# Patient Record
Sex: MALE | Race: WHITE | Employment: FULL TIME | URBAN - METROPOLITAN AREA
[De-identification: names, ages, dates, MRNs, and addresses within clinical notes are randomized per-mention and may not be internally consistent; named-entity substitution may affect disease eponyms.]

---

## 2019-02-14 ENCOUNTER — HOSPITAL ENCOUNTER (OUTPATIENT)
Dept: PHYSICAL THERAPY | Age: 54
Discharge: HOME | End: 2019-02-14
Payer: COMMERCIAL

## 2019-02-14 PROCEDURE — 97162 PT EVAL MOD COMPLEX 30 MIN: CPT | Performed by: PHYSICAL THERAPIST

## 2019-02-14 PROCEDURE — 97110 THERAPEUTIC EXERCISES: CPT | Performed by: PHYSICAL THERAPIST

## 2019-02-14 NOTE — PROGRESS NOTES
PT DAILY TREATMENT NOTE  Patient Name: Janet Valle Date:2019 : 1965 [x]  Patient  Verified Payor: Flores Garsias / Plan: [employer] / Product Type: HMO /   
In [employer]  Out time:915 Total Treatment Time (min): 41 Visit #: 1 of  Treatment Area: Low back pain [M54.5] SUBJECTIVE Pain Level (0-10 scale): 1-2/10 Any medication changes, allergies to medications, adverse drug reactions, diagnosis change, or new procedure performed?: [x] No    [] Yes (see summary sheet for update) Subjective functional status/changes:   [] No changes reported Pt is a 52year old male with subjective complaints of chronic low back pain that started 3 years ago after bending over playing with his dog. He notes that he had increased back pain without radicular symptoms and was unable to standing up. He notes that every year since he has tweaked his back however, over the last year he has tweaked it more frequently. He notes that last week he re-injured his back picking up his symptoms. He currently has increased/sharp pain in the center of his low back that he rates a 1-2/10. Functional limitations include lifting, sitting long periods of time, heavy lifting, playing golf. He takes over the counter medication for pain management. Xray showed a little arthritis. No MRI performed of lumbar spine. PMHx: skin cancer on neck, face, back. Negative for red flags. FOTO: 58/100. OBJECTIVE 16 min [x]Eval                  []Re-Eval  
 
 
25 min Therapeutic Exercise:  [x] See flow sheet : Bianka extension principle Rationale: increase ROM and increase strength to improve the patients ability to improve functional mobility With 
 [] TE 
 [] TA 
 [] neuro 
 [] other: Patient Education: [x] Review HEP [] Progressed/Changed HEP based on:  
[] positioning   [] body mechanics   [] transfers   [] heat/ice application [] Graston Education: Explained the effects and benefits of Graston Technique therapy including potential for post treatment soreness and bruising. [] Other:   
 
Other Objective/Functional Measures:  
Upon evaluation, pt ambulates with normalized gait pattern. AROM of the lumbar spine is WNLs. Repeated extension in standing increased low back pain however, in prone reduced symptoms. SI in normal alignment. Strength not assessed secondary to time constraints. Pt given REIL for HEP to perform every hour to assess effects next visit. Pain Level (0-10 scale) post treatment: 1/10 ASSESSMENT/Changes in Function:[x]  See Plan of Care 
[]  See progress note/recertification 
[]  See Discharge Summary Progress towards goals / Updated goals: PER POC PLAN 
[]  Upgrade activities as tolerated     [x]  Continue plan of care 
[]  Update interventions per flow sheet      
[]  Discharge due to:_ 
[x]  Other 2-3x/week for 4 weeks Justification for Eval Code Complexity: 
Patient History : chronicity Examination see exam  
Clinical Presentation: evovling Clinical Decision Making : FOTO : 59 /100 Capri Bolivar DPT 2/14/2019  8:37 AM 
 
No future appointments.

## 2019-02-14 NOTE — PROGRESS NOTES
[address] PH[facility]ERAPY AT [facility] [address]  Phone [phone]  Fax [phone] PLAN OF CARE / STATEMENT OF MEDICAL NECESSITY FOR PHYSICAL THERAPY SERVICES Patient Name: Yonas Deleon : 1965 Medical  
Diagnosis: Low back pain [M54.5] Treatment Diagnosis: Low back pain Onset Date: chronic Referral Source: Malcom Cowan MD Start of Care [facility]): 2019 Prior Hospitalization: See medical history Provider #: [id number] Prior Level of Function: IND Comorbidities: Skin cancer Medications: Verified on Patient Summary List  
The Plan of Care and following information is based on the information from the initial evaluation.  
================================================================================= Assessment / key information:  Pt is a 52year old male with subjective complaints of chronic low back pain that started 3 years ago after bending over playing with his dog. He notes that he had increased back pain without radicular symptoms and was unable to standing up. He notes that every year since he has tweaked his back however, over the last year he has tweaked it more frequently. He notes that last week he re-injured his back picking up his symptoms. He currently has increased/sharp pain in the center of his low back that he rates a 1-2/10. Functional limitations include lifting, sitting long periods of time, heavy lifting, playing golf. He takes over the counter medication for pain management. Xray showed a little arthritis. No MRI performed of lumbar spine. PMHx: skin cancer on neck, face, back. Negative for red flags. FOTO: 58/100. Upon evaluation, pt ambulates with normalized gait pattern. AROM of the lumbar spine is WNLs. Repeated extension in standing increased low back pain however, in prone reduced symptoms. SI in normal alignment.  Strength not assessed secondary to time constraints. Pt given REIL for HEP to perform every hour to assess effects next visit. Pt would benefit from a course of skilled PT to improve back pain to prevent future occurrence of back pain.  
================================================================================= Eval Complexity: History: MEDIUM  Complexity : 1-2 comorbidities / personal factors will impact the outcome/ POC Exam:HIGH Complexity : 4+ Standardized tests and measures addressing body structure, function, activity limitation and / or participation in recreation  Presentation: MEDIUM Complexity : Evolving with changing characteristics  Clinical Decision Making:MEDIUM Complexity : FOTO score of 26-74Overall Complexity:MEDIUM Problem List: pain affecting function, decrease ROM, decrease strength, impaired gait/ balance, decrease ADL/ functional abilitiies, decrease activity tolerance, decrease flexibility/ joint mobility and decrease transfer abilities Treatment Plan may include any combination of the following: Therapeutic exercise, Therapeutic activities, Neuromuscular re-education, Physical agent/modality, Gait/balance training, Manual therapy and Patient education Patient / Family readiness to learn indicated by: asking questions and trying to perform skills Persons(s) to be included in education: patient (P) Barriers to Learning/Limitations: None Measures taken:   
Patient Goal (s): Reduce constant pain and tightness hopefully prevent future episodes Patient self reported health status: good Rehabilitation Potential: good ? Short Term Goals: To be accomplished in  1  weeks: 1. Pt will be IND and compliant with HEP for self-management of symptoms. ? Long Term Goals: To be accomplished in  4  weeks: 1. Pt will be able to sit for 4 hours without pain to be able to perform job duties without restrictions. 2. Pt will improve core/lumbar strength to 5/5 to prevent re-occurrence of symptoms. 3. Pt will be able lift 20# from floor to counter with proper body mechanics to decrease stress on the lower spine. 4. Pt will improve FOTO score to at least 69/100 as a functional indicator of improved mobility. Frequency / Duration:   Patient to be seen  2-3  times per week for 4  weeks: 
Patient / Caregiver education and instruction: exercises G-Codes (GP): QI Therapist Signature: Ottoniel Easton DPT Date: 2/14/2019 Certification Period: NA Time: 9:28 AM  
=========================================================================================== I certify that the above Physical Therapy Services are being furnished while the patient is under my care. I agree with the treatment plan and certify that this therapy is necessary. Physician Signature:       Date:      Time:  Please sign and return to [facility] or you may fax the signed copy to [phone]. Thank you.

## 2019-02-18 ENCOUNTER — HOSPITAL ENCOUNTER (OUTPATIENT)
Dept: PHYSICAL THERAPY | Age: 54
Discharge: HOME | End: 2019-02-18
Payer: COMMERCIAL

## 2019-02-18 PROCEDURE — 97110 THERAPEUTIC EXERCISES: CPT | Performed by: PHYSICAL THERAPIST

## 2019-02-18 PROCEDURE — 97012 MECHANICAL TRACTION THERAPY: CPT | Performed by: PHYSICAL THERAPIST

## 2019-02-18 NOTE — PROGRESS NOTES
PT DAILY TREATMENT NOTE  Patient Name: Zenon Lozano Date:2019 : 1965 [x]  Patient  Verified Payor: Paulie Calles / Plan: [employer] / Product Type: HMO /   
In time:745am  Out time:845am 
Total Treatment Time (min): 60 Total Timed Codes (min): 60 
1:1 Treatment Time (min): na  
Visit #: 2 of  Treatment Area: Low back pain [M54.5] SUBJECTIVE Pain Level (0-10 scale): 1 Any medication changes, allergies to medications, adverse drug reactions, diagnosis change, or new procedure performed?: [x] No    [] Yes (see summary sheet for update) Subjective functional status/changes:   [] No changes reported Pt retold hx. States the REIL caused elbow sx. OBJECTIVE Modality rationale: decrease inflammation, decrease pain, increase tissue extensibility and increase muscle contraction/control to improve the patients ability to perform functional mobility and improve activity  endurance Min Type Additional Details  
 [] Estim: []Att   []Unatt  []TENS instruct []IFC  []Premod []NMES                       []Other:  []w/US   []w/ice   []w/heat Position: Location:  
15 [x]  Traction: [] Cervical       []Lumbar 
                     [] Prone          []Supine []Intermittent   []Continuous Lbs:85/60[] before manual 
[] after manual  
 []  Ultrasound: []Continuous   [] Pulsed []1MHz   []3MHz Location: 
W/cm2:  
 []  Iontophoresis with dexamethasone Location: [] Take home patch  
[] In clinic  
 []  Ice     []  heat 
[]  Ice massage Position: Location:  
 []  Vasopneumatic Device Pressure: [] lo [] med [] hi  
Temp: [] lo [] med [] hi  
[] Skin assessment post-treatment:  []intact []redness- no adverse reaction 
     []redness  adverse reaction:  
 
 
45 min Therapeutic Exercise:  [] See flow sheet :  
Rationale: increase ROM, increase strength and increase proprioception to improve the patients ability to perform functional mobility and improve activity  endurance NT min Manual Therapy:    
Rationale: decrease pain, increase ROM, increase tissue extensibility, decrease trigger points and increase postural awareness to perform functional mobility and improve activity  endurance 
 
       
x min Patient Education: [x] Review HEP    [] Progressed/Changed HEP based on:  
[] positioning   [x] body mechanics   [] transfers   [] heat/ice application Other Objective/Functional Measures: Modified the REIL to 2 sets at wall and 1 set lying due to elbow soreness and sx. Pelvis aligned. L glut with decreased activation compared to R with TKEs: required TC for activation. Pain Level (0-10 scale) post treatment: 0 I feel great ASSESSMENT/Changes in Function: First FU after IE. PT did well with all new therex with decreased activation of gluts noted. Added mech traction today, assess effects NV Patient will continue to benefit from skilled PT services to modify and progress therapeutic interventions, address functional mobility deficits, address ROM deficits, address strength deficits, analyze and address soft tissue restrictions, analyze and cue movement patterns, analyze and modify body mechanics/ergonomics, assess and modify postural abnormalities, address imbalance/dizziness and instruct in home and community integration to attain remaining goals. []  See Plan of Care 
[]  See progress note/recertification 
[]  See Discharge Summary Progress towards goals / Updated goals: 
First FU · Short Term Goals: To be accomplished in  1  weeks: 1. Pt will be IND and compliant with HEP for self-management of symptoms. Progressing 2/18/19 · Long Term Goals: To be accomplished in  4  weeks: 1. Pt will be able to sit for 4 hours without pain to be able to perform job duties without restrictions.   
2. Pt will improve core/lumbar strength to 5/5 to prevent re-occurrence of symptoms. 3. Pt will be able lift 20# from floor to counter with proper body mechanics to decrease stress on the lower spine. 4. Pt will improve FOTO score to at least 69/100 as a functional indicator of improved mobility. PLAN 
[]  Upgrade activities as tolerated     []  Continue plan of care 
[]  Update interventions per flow sheet      
[]  Discharge due to:_ 
[]  Other:_   
 
Keven Brown, PT 2/18/2019  6:48 AM 
 
 
 
'

## 2019-02-22 ENCOUNTER — HOSPITAL ENCOUNTER (OUTPATIENT)
Dept: PHYSICAL THERAPY | Age: 54
Discharge: HOME | End: 2019-02-22
Payer: COMMERCIAL

## 2019-02-22 PROCEDURE — 97110 THERAPEUTIC EXERCISES: CPT

## 2019-02-22 PROCEDURE — 97140 MANUAL THERAPY 1/> REGIONS: CPT

## 2019-02-22 PROCEDURE — 97012 MECHANICAL TRACTION THERAPY: CPT

## 2019-02-22 NOTE — PROGRESS NOTES
PT DAILY TREATMENT NOTE  Patient Name: Feng Marsh Date:2019 : 1965 [x]  Patient  Verified Payor: Jose Grew / Plan: [employer] / Product Type: HMO /   
In time: 7:00  Out time:8:05 Total Treatment Time (min): 65 Visit #: 3 of  Treatment Area: Low back pain [M54.5] SUBJECTIVE Pain Level (0-10 scale): 1/10 Any medication changes, allergies to medications, adverse drug reactions, diagnosis change, or new procedure performed?: [x] No    [] Yes (see summary sheet for update) Subjective functional status/changes:   [] No changes reported I felt better for two days after I had the traction but then I had to fly in an airplane yesterday and I am sore this morning over the (R) side of my LBP OBJECTIVE Modality rationale: decrease inflammation, decrease pain, increase tissue extensibility and increase muscle contraction/control to improve the patients ability to perform functional mobility and improve activity  endurance Type Additional Details  
[] Estim: []Att   []Unatt  []TENS instruct []IFC  []Premod []NMES                       []Other:  []w/US   []w/ice   []w/heat Position: Location:  
[x]  Traction: [] Cervical       []Lumbar 
                     [] Prone          []Supine []Intermittent   []Continuous Lbs:100#/85 15 minutes  
 before manual 
[x] after manual  
[]  Ultrasound: []Continuous   [] Pulsed []1MHz   []3MHz Location: 
W/cm2:  
[]  Iontophoresis with dexamethasone Location: [] Take home patch  
[] In clinic  
[]  Ice     []  heat 
[]  Ice massage Position: Location:  
[]  Vasopneumatic Device Pressure: [] lo [] med [] hi  
Temp: [] lo [] med [] hi  
[] Skin assessment post-treatment:  []intact []redness- no adverse reaction 
     []redness  adverse reaction:  
 
 
35/30 min Therapeutic Exercise:  [x] See flow sheet :5 min NC for warm up Rationale: increase ROM, increase strength and increase proprioception to improve the patients ability to perform functional mobility and improve activity  endurance 15 min Manual Therapy:  DTM and TPR to the (R) QL and lumbar paraspinals and then MET to correct (R) up-slip and (R) posterior rotated Rationale: decrease pain, increase ROM, increase tissue extensibility, decrease trigger points and increase postural awareness to perform functional mobility and improve activity  endurance 
 
       
x min Patient Education: [x] Review HEP    [] Progressed/Changed HEP based on:  
[] positioning   [x] body mechanics   [] transfers   [] heat/ice application Other Objective/Functional Measures: 
Patient presented with (R) up-slip and (R) posterior rotation and trigger point to the (R) QL and lumbar paraspinals Performed manual and MET/leg pull to correct Increased traction to 100#/85 Frutoso Golder GOALS - LTG 1. Pt will be able to sit for 4 hours without pain to be able to perform job duties without restrictions. Progressing able to sit for 2 hours  2/22/19 Pain Level (0-10 scale) post treatment: 1/10 ASSESSMENT/Changes in Function:  
Patient will continue to benefit from skilled PT services to modify and progress therapeutic interventions, address functional mobility deficits, address ROM deficits, address strength deficits, analyze and address soft tissue restrictions, analyze and cue movement patterns, analyze and modify body mechanics/ergonomics, assess and modify postural abnormalities, address imbalance/dizziness and instruct in home and community integration to attain remaining goals. [x]  See Plan of Care 
[]  See progress note/recertification 
[]  See Discharge Summary Progress towards goals / Updated goals: 
First FU · Short Term Goals: To be accomplished in  1  weeks: 1. Pt will be IND and compliant with HEP for self-management of symptoms. Progressing 2/18/19 · Long Term Goals: To be accomplished in  4  weeks: 1. Pt will be able to sit for 4 hours without pain to be able to perform job duties without restrictions. Progressing able to sit for 2 hours  2/22/19 2. Pt will improve core/lumbar strength to 5/5 to prevent re-occurrence of symptoms. 3. Pt will be able lift 20# from floor to counter with proper body mechanics to decrease stress on the lower spine. 4. Pt will improve FOTO score to at least 69/100 as a functional indicator of improved mobility. PLAN [x]  Upgrade activities as tolerated     [x]  Continue plan of care 
[]  Update interventions per flow sheet      
[]  Discharge due to:_ 
[]  Other:_   
 
Gloria Donis PTA 2/22/2019  6:48 AM 
 
 
 
'

## 2019-02-25 ENCOUNTER — HOSPITAL ENCOUNTER (OUTPATIENT)
Dept: PHYSICAL THERAPY | Age: 54
Discharge: HOME | End: 2019-02-25
Payer: COMMERCIAL

## 2019-02-25 PROCEDURE — 97110 THERAPEUTIC EXERCISES: CPT | Performed by: PHYSICAL THERAPIST

## 2019-02-25 PROCEDURE — 97012 MECHANICAL TRACTION THERAPY: CPT | Performed by: PHYSICAL THERAPIST

## 2019-02-25 NOTE — PROGRESS NOTES
PT DAILY TREATMENT NOTE  Patient Name: Aydee Clinton Date:2019 : 1965 [x]  Patient  Verified Payor: Ramy Both / Plan: [employer] / Product Type: HMO /   
In time: 1002am  Out time: 1100 Total Treatment Time (min):58 Visit #: 4 of  Treatment Area: Low back pain [M54.5] SUBJECTIVE Pain Level (0-10 scale): 1.5/10 Any medication changes, allergies to medications, adverse drug reactions, diagnosis change, or new procedure performed?: [x] No    [] Yes (see summary sheet for update) Subjective functional status/changes:   [] No changes reported I don't have that jayden horse pain anymore, but its just achy sore when I sit for too long OBJECTIVE Modality rationale: decrease inflammation, decrease pain, increase tissue extensibility and increase muscle contraction/control to improve the patients ability to perform functional mobility and improve activity  endurance Type Additional Details  
 [] Estim: []Att   []Unatt  []TENS instruct []IFC  []Premod []NMES                       []Other:  []w/US   []w/ice   []w/heat Position: Location:  
15 [x]  Traction: [] Cervical       []Lumbar 
                     [] Prone          []Supine []Intermittent   []Continuous Lbs:100#/85 15 minutes  
 before manual 
[] after manual  
 []  Ultrasound: []Continuous   [] Pulsed []1MHz   []3MHz Location: 
W/cm2:  
 []  Iontophoresis with dexamethasone Location: [] Take home patch  
[] In clinic  
 []  Ice     []  heat 
[]  Ice massage Position: Location:  
 []  Vasopneumatic Device Pressure: [] lo [] med [] hi  
Temp: [] lo [] med [] hi  
[] Skin assessment post-treatment:  []intact []redness- no adverse reaction 
     []redness  adverse reaction:  
 
 
43/38 min Therapeutic Exercise:  [x] See flow sheet :5 min NC for warm up Rationale: increase ROM, increase strength and increase proprioception to improve the patients ability to perform functional mobility and improve activity  endurance PD/NI min Manual Therapy: aligned pelvis Rationale: decrease pain, increase ROM, increase tissue extensibility, decrease trigger points and increase postural awareness to perform functional mobility and improve activity  endurance 
 
       
x min Patient Education: [x] Review HEP    [] Progressed/Changed HEP based on:  
[] positioning   [x] body mechanics   [] transfers   [] heat/ice application Other Objective/Functional Measures: 
Performs all therex with good form with min VC and no pain Educate in box lifts NV for progression to LTG 3 
 . Pain Level (0-10 scale) post treatment: .5/10 ASSESSMENT/Changes in Function:  
Pt is doing well within current  treatment. Did well with elevated pull of traction last visit. Pelvis aligned today and no QL TrP noted. Patient will continue to benefit from skilled PT services to modify and progress therapeutic interventions, address functional mobility deficits, address ROM deficits, address strength deficits, analyze and address soft tissue restrictions, analyze and cue movement patterns, analyze and modify body mechanics/ergonomics, assess and modify postural abnormalities, address imbalance/dizziness and instruct in home and community integration to attain remaining goals. [x]  See Plan of Care 
[]  See progress note/recertification 
[]  See Discharge Summary Progress towards goals / Updated goals: · Short Term Goals: To be accomplished in  1  weeks: 1. Pt will be IND and compliant with HEP for self-management of symptoms. Progressing 2/18/19 · Long Term Goals: To be accomplished in  4  weeks: 1. Pt will be able to sit for 4 hours without pain to be able to perform job duties without restrictions. Progressing able to sit for 2 hours  2/22/19 2. Pt will improve core/lumbar strength to 5/5 to prevent re-occurrence of symptoms. Pelvis aligned and full bridge performed 2/25/19 3. Pt will be able lift 20# from floor to counter with proper body mechanics to decrease stress on the lower spine. NV 
4. Pt will improve FOTO score to at least 69/100 as a functional indicator of improved mobility. PLAN [x]  Upgrade activities as tolerated     [x]  Continue plan of care 
[]  Update interventions per flow sheet      
[]  Discharge due to:_ 
[]  Other:_   
 
Germania Yuen, PT 2/25/2019  6:48 AM 
 
 
 
'

## 2019-03-01 ENCOUNTER — HOSPITAL ENCOUNTER (OUTPATIENT)
Dept: PHYSICAL THERAPY | Age: 54
Discharge: HOME | End: 2019-03-01
Payer: COMMERCIAL

## 2019-03-01 PROCEDURE — 97012 MECHANICAL TRACTION THERAPY: CPT

## 2019-03-01 PROCEDURE — 97110 THERAPEUTIC EXERCISES: CPT

## 2019-03-01 NOTE — PROGRESS NOTES
PT DAILY TREATMENT NOTE  Patient Name: Zenon Lozano Date:3/1/2019 : 1965 [x]  Patient  Verified Payor: Paulie Calles / Plan: [employer] / Product Type: HMO /   
In time:245  Out time:345 Total Treatment Time (min): 60 Visit #: 5 of  Treatment Area: Low back pain [M54.5] SUBJECTIVE Pain Level (0-10 scale): 0.25 Any medication changes, allergies to medications, adverse drug reactions, diagnosis change, or new procedure performed?: [x] No    [] Yes (see summary sheet for update) Subjective functional status/changes:   [] No changes reported Patient reports compliance with HEP and overall decrease in pain levels. OBJECTIVE Modality rationale: decrease inflammation, decrease pain and increase tissue extensibility to improve the patients ability to tolerate prolonged walking and standing Min Type Additional Details  
 [] Estim: []Att   []Unatt  []TENS instruct []IFC  []Premod []NMES                       []Other:  []w/US   []w/ice   []w/heat Position: Location:  
15+5 set up  [x]  Traction: [] Cervical       [x]Lumbar 
                     [x] Prone          []Supine []Intermittent   []Continuous Lbs: 105/95 [] before manual 
[] after manual  
 []  Ultrasound: []Continuous   [] Pulsed []1MHz   []3MHz Location: 
W/cm2:  
 []  Iontophoresis with dexamethasone Location: [] Take home patch  
[] In clinic  
 []  Ice     []  heat 
[]  Ice massage Position: Location:  
 []  Vasopneumatic Device Pressure: [] lo [] med [] hi  
Temp: [] lo [] med [] hi  
[x] Skin assessment post-treatment:  [x]intact []redness- no adverse reaction 
     []redness  adverse reaction:  
 
 
40/35 min Therapeutic Exercise:  [x] See flow sheet :5 min NC for warm up Rationale: increase ROM and increase strength to improve the patients ability to tolerate prolonged walking and standing min Patient Education: [x] Review HEP    [] Progressed/Changed HEP based on:  
[] positioning   [] body mechanics   [] transfers   [] heat/ice application Other Objective/Functional Measures:  
Level SI-- aligned. Increased Traction to 105# with good responce. Pain Level (0-10 scale) post treatment: 0.25 ASSESSMENT/Changes in Function: Patient is progressing towards goals with decreased reports of pain and improved tolerance to activity/exercise. Patient will continue to benefit from skilled PT services to modify and progress therapeutic interventions, address functional mobility deficits, address ROM deficits, address strength deficits, analyze and address soft tissue restrictions and analyze and cue movement patterns to attain remaining goals. []  See Plan of Care 
[]  See progress note/recertification 
[]  See Discharge Summary Progress towards goals / Updated goals: · Short Term Goals: To be accomplished in  1  weeks: 1. Pt will be IND and compliant with HEP for self-management of symptoms.  Progressing 2/18/19 · Long Term Goals: To be accomplished in  4  weeks: 1. Pt will be able to sit for 4 hours without pain to be able to perform job duties without restrictions. Progressing able to sit for 2 hours  2/22/19 2. Pt will improve core/lumbar strength to 5/5 to prevent re-occurrence of symptoms. Pelvis aligned and full bridge performed 2/25/19 3. Pt will be able lift 20# from floor to counter with proper body mechanics to decrease stress on the lower spine. NV 
4. Pt will improve FOTO score to at least 69/100 as a functional indicator of improved mobility.  PLAN 
[]  Upgrade activities as tolerated     [x]  Continue plan of care 
[]  Update interventions per flow sheet      
[]  Discharge due to:_ 
[x]  Other:_  Progress HEP NV if tolerated González Carpenter PT 3/1/2019  3:52 PM

## 2019-03-04 ENCOUNTER — HOSPITAL ENCOUNTER (OUTPATIENT)
Dept: PHYSICAL THERAPY | Age: 54
Discharge: HOME | End: 2019-03-04
Payer: COMMERCIAL

## 2019-03-04 PROCEDURE — 97012 MECHANICAL TRACTION THERAPY: CPT | Performed by: PHYSICAL THERAPIST

## 2019-03-04 PROCEDURE — 97110 THERAPEUTIC EXERCISES: CPT | Performed by: PHYSICAL THERAPIST

## 2019-03-04 NOTE — PROGRESS NOTES
PT DAILY TREATMENT NOTE  Patient Name: Martha Landin Date:3/4/2019 : 1965 [x]  Patient  Verified Payor: Arminda Vasquez / Plan: [employer] / Product Type: HMO /   
In time: 955  Out time: 001 Total Treatment Time (min): 61 Visit #: 6 of  Treatment Area: Low back pain [M54.5] SUBJECTIVE Pain Level (0-10 scale): .25/5 Any medication changes, allergies to medications, adverse drug reactions, diagnosis change, or new procedure performed?: [x] No    [] Yes (see summary sheet for update) Subjective functional status/changes:   [] No changes reported I didn't have any pain when I woke up but then when I sat down for breakfast I got pain in the low back. R>L. OBJECTIVE Modality rationale: decrease inflammation, decrease pain and increase tissue extensibility to improve the patients ability to tolerate prolonged walking and standing Min Type Additional Details  
 [] Estim: []Att   []Unatt  []TENS instruct []IFC  []Premod []NMES                       []Other:  []w/US   []w/ice   []w/heat Position: Location:  
15  [x]  Traction: [] Cervical       [x]Lumbar 
                     [x] Prone          []Supine []Intermittent   []Continuous Lbs: 105/90 [] before manual 
[] after manual  
 []  Ultrasound: []Continuous   [] Pulsed []1MHz   []3MHz Location: 
W/cm2:  
 []  Iontophoresis with dexamethasone Location: [] Take home patch  
[] In clinic  
 []  Ice     []  heat 
[]  Ice massage Position: Location:  
 []  Vasopneumatic Device Pressure: [] lo [] med [] hi  
Temp: [] lo [] med [] hi  
[x] Skin assessment post-treatment:  [x]intact []redness- no adverse reaction 
     []redness  adverse reaction:  
 
 
46 min Therapeutic Exercise:  [x] See flow sheet :5 min NC for warm up Rationale: increase ROM and increase strength to improve the patients ability to tolerate prolonged walking and standing 
       
x min Patient Education: [x] Review HEP    [] Progressed/Changed HEP based on: sequencing of gluts [] positioning   [] body mechanics   [] transfers   [] heat/ice application Other Objective/Functional Measures:  
 Progressed HEP to glut sequencing of heel press, TKE, and hip extensions, then bridges to activate L glut max. Instructed Pt in proper sequencing of therex Cues needed for full bridge with proper form. Pain Level (0-10 scale) post treatment: .25 ASSESSMENT/Changes in Function: Patient continues to report good pain reduction with traction. Pt has decreased L glut max activation noted with TKE, instructed pt to do exercises in a certain order (see above ) for proper activation with HEP. Patient will continue to benefit from skilled PT services to modify and progress therapeutic interventions, address functional mobility deficits, address ROM deficits, address strength deficits, analyze and address soft tissue restrictions and analyze and cue movement patterns to attain remaining goals. []  See Plan of Care 
[]  See progress note/recertification 
[]  See Discharge Summary Progress towards goals / Updated goals: · Short Term Goals: To be accomplished in  1  weeks: 1. Pt will be IND and compliant with HEP for self-management of symptoms.  Progressing 2/18/19 · Long Term Goals: To be accomplished in  4  weeks: 1. Pt will be able to sit for 4 hours without pain to be able to perform job duties without restrictions. Progressing able to sit for 2 hours sometimes, but varies with some days much less  3/4/19 2. Pt will improve core/lumbar strength to 5/5 to prevent re-occurrence of symptoms. Pelvis aligned and full bridge performed 2/25/19 3. Pt will be able lift 20# from floor to counter with proper body mechanics to decrease stress on the lower spine.   NV 
 4. Pt will improve FOTO score to at least 69/100 as a functional indicator of improved mobility.  PLAN 
[]  Upgrade activities as tolerated     [x]  Continue plan of care 
[]  Update interventions per flow sheet      
[]  Discharge due to:_ 
[x]  Other:_ 
 
Melisa Plan, PT 3/4/2019  3:52 PM

## 2019-03-08 ENCOUNTER — HOSPITAL ENCOUNTER (OUTPATIENT)
Dept: PHYSICAL THERAPY | Age: 54
Discharge: HOME | End: 2019-03-08
Payer: COMMERCIAL

## 2019-03-08 PROCEDURE — 97012 MECHANICAL TRACTION THERAPY: CPT

## 2019-03-08 PROCEDURE — 97110 THERAPEUTIC EXERCISES: CPT

## 2019-03-08 NOTE — PROGRESS NOTES
PT DAILY TREATMENT NOTE     Patient Name: María Brown  Date:3/8/2019  : 1965  [x]  Patient  Verified  Payor: Buddy [place] / Plan: [employer] / Product Type: HMO /    In time: 56  Out time: 340  Total Treatment Time (min): 58    Visit #: 7 of     Treatment Area: Low back pain [M54.5]    SUBJECTIVE  Pain Level (0-10 scale):  1.5/10   Any medication changes, allergies to medications, adverse drug reactions, diagnosis change, or new procedure performed?: [x] No    [] Yes (see summary sheet for update)  Subjective functional status/changes:   [] No changes reported  Im not sure why my pain level is higher today.      OBJECTIVE  Modality rationale: decrease inflammation, decrease pain and increase tissue extensibility to improve the patients ability to tolerate prolonged walking and standing    Min Type Additional Details    [] Estim: []Att   []Unatt  []TENS instruct                 []IFC  []Premod []NMES                       []Other:  []w/US   []w/ice   []w/heat  Position:  Location:   15  [x]  Traction: [] Cervical       [x]Lumbar                       [x] Prone          []Supine                       []Intermittent   []Continuous Lbs: 105/90#--15 minutes with 5 min set up   [] before manual  [] after manual    []  Ultrasound: []Continuous   [] Pulsed                           []1MHz   []3MHz Location:  W/cm2:    []  Iontophoresis with dexamethasone         Location: [] Take home patch   [] In clinic    []  Ice     []  heat  []  Ice massage Position:  Location:    []  Vasopneumatic Device Pressure: [] lo [] med [] hi   Temp: [] lo [] med [] hi   [x] Skin assessment post-treatment:  [x]intact []redness- no adverse reaction       []redness  adverse reaction:       38/33 min Therapeutic Exercise:  [x] See flow sheet :5 min NC for warm up   Rationale: increase ROM and increase strength to improve the patients ability to tolerate prolonged walking and standing          x min Patient Education: [x] Review HEP    [] Progressed/Changed HEP based on: sequencing of gluts  [] positioning   [] body mechanics   [] transfers   [] heat/ice application        Other Objective/Functional Measures:    Progressed PREs with increased resistance with PPT w band, increased reps of Glut sequence and added resistance to bridges. Pain Level (0-10 scale) post treatment: .25     ASSESSMENT/Changes in Function:   Patient continues to respond well to extension based exercises and prone mechanical traction. Patient will continue to benefit from skilled PT services to modify and progress therapeutic interventions, address functional mobility deficits, address ROM deficits, address strength deficits, analyze and address soft tissue restrictions and analyze and cue movement patterns to attain remaining goals. []  See Plan of Care  []  See progress note/recertification  []  See Discharge Summary         Progress towards goals / Updated goals: · Short Term Goals: To be accomplished in  1  weeks:  · 1. Pt will be IND and compliant with HEP for self-management of symptoms.  RESOLVED per patient report 3/8/2018     Long Term Goals: To be accomplished in  4  weeks:  1. Pt will be able to sit for 4 hours without pain to be able to perform job duties without restrictions. Progressing able to sit for 2 hours sometimes, but varies with some days much less  3/4/19  2. Pt will improve core/lumbar strength to 5/5 to prevent re-occurrence of symptoms. Pelvis aligned and full bridge performed with hip abduction resistance  3/8//19  3. Pt will be able lift 20# from floor to counter with proper body mechanics to decrease stress on the lower spine. NV  4.  Pt will improve FOTO score to at least 69/100 as a functional indicator of improved mobility.     PLAN  []  Upgrade activities as tolerated     [x]  Continue plan of care  []  Update interventions per flow sheet       []  Discharge due to:_  []  Other:_    Jerry Hutchinson Rosemarie, PT 3/8/2019  3:52 PM

## 2019-03-11 ENCOUNTER — HOSPITAL ENCOUNTER (OUTPATIENT)
Dept: PHYSICAL THERAPY | Age: 54
Discharge: HOME | End: 2019-03-11
Payer: COMMERCIAL

## 2019-03-11 PROCEDURE — 97012 MECHANICAL TRACTION THERAPY: CPT | Performed by: PHYSICAL THERAPIST

## 2019-03-11 PROCEDURE — 97110 THERAPEUTIC EXERCISES: CPT | Performed by: PHYSICAL THERAPIST

## 2019-03-11 NOTE — PROGRESS NOTES
PT DAILY TREATMENT NOTE     Patient Name: Marcelo Edgar  Date:3/11/2019  : 1965  [x]  Patient  Verified  Payor: Betina Velazquez / Plan: [employer] / Product Type: HMO /    In time: 744 am   Out time: 855  Total Treatment Time (min): 71    Visit #: 8 of     Treatment Area: Low back pain [M54.5]    SUBJECTIVE  Pain Level (0-10 scale):  210   Any medication changes, allergies to medications, adverse drug reactions, diagnosis change, or new procedure performed?: [x] No    [] Yes (see summary sheet for update)  Subjective functional status/changes:   [] No changes reported  I am having more pain after walking on the TM for about 40 min yesterday.      OBJECTIVE  Modality rationale: decrease inflammation, decrease pain and increase tissue extensibility to improve the patients ability to tolerate prolonged walking and standing    Min Type Additional Details    [] Estim: []Att   []Unatt  []TENS instruct                 []IFC  []Premod []NMES                       []Other:  []w/US   []w/ice   []w/heat  Position:  Location:   15  [x]  Traction: [] Cervical       [x]Lumbar                       [x] Prone          []Supine                       []Intermittent   []Continuous Lbs: 105/90#--15 minutes with 5 min set up   [] before manual  [] after manual    []  Ultrasound: []Continuous   [] Pulsed                           []1MHz   []3MHz Location:  W/cm2:    []  Iontophoresis with dexamethasone         Location: [] Take home patch   [] In clinic    []  Ice     []  heat  []  Ice massage Position:  Location:    []  Vasopneumatic Device Pressure: [] lo [] med [] hi   Temp: [] lo [] med [] hi   [x] Skin assessment post-treatment:  [x]intact []redness- no adverse reaction       []redness  adverse reaction:       56/51 min Therapeutic Exercise:  [x] See flow sheet :5 min NC for warm up   Rationale: increase ROM and increase strength to improve the patients ability to tolerate prolonged walking and standing          x min Patient Education: [x] Review HEP    [] Progressed/Changed HEP based on: sequencing of gluts  [] positioning   [] body mechanics   [] transfers   [] heat/ice application        Other Objective/Functional Measures:   Box lifts initiated today: performed BET with dowel aren on head and sacrum for hip hinge and progressed to box lifts 12# using hip hinge, added lateral TB walkaways  Slight L lateral shift noted in upper l/s spine with  Prone REIL     Pain Level (0-10 scale) post treatment: 1     ASSESSMENT/Changes in Function:   Patient has presented with level pelvis for 4 treatments in a row. Continues with reports of pain in R l/s ps mm following increased activity. Patient will continue to benefit from skilled PT services to modify and progress therapeutic interventions, address functional mobility deficits, address ROM deficits, address strength deficits, analyze and address soft tissue restrictions and analyze and cue movement patterns to attain remaining goals. []  See Plan of Care  []  See progress note/recertification  []  See Discharge Summary         Progress towards goals / Updated goals: · Short Term Goals: To be accomplished in  1  weeks:  · 1. Pt will be IND and compliant with HEP for self-management of symptoms.  RESOLVED per patient report 3/8/2018     Long Term Goals: To be accomplished in  4  weeks:  1. Pt will be able to sit for 4 hours without pain to be able to perform job duties without restrictions. Progressing able to sit for 2 hours sometimes, but varies with some days much less  3/4/19  2. Pt will improve core/lumbar strength to 5/5 to prevent re-occurrence of symptoms. Pelvis aligned and full bridge performed with hip abduction resistance  3/8//19  3. Pt will be able lift 20# from floor to counter with proper body mechanics to decrease stress on the lower spine. box lifts 12 # initiated today along with BET 3/11/19  4.  Pt will improve FOTO score to at least 69/100 as a functional indicator of improved mobility.     PLAN  []  Upgrade activities as tolerated     [x]  Continue plan of care  []  Update interventions per flow sheet       []  Discharge due to:_  []  Other:_    Gladys Girard, PT 3/11/2019  3:52 PM MED/SURG

## 2019-03-15 ENCOUNTER — HOSPITAL ENCOUNTER (OUTPATIENT)
Dept: PHYSICAL THERAPY | Age: 54
Discharge: HOME | End: 2019-03-15
Payer: COMMERCIAL

## 2019-03-15 PROCEDURE — 97110 THERAPEUTIC EXERCISES: CPT

## 2019-03-15 NOTE — PROGRESS NOTES
PT DAILY TREATMENT NOTE     Patient Name: María Brown  Date:3/15/2019  : 1965  [x]  Patient  Verified  Payor: Buddy [place] / Plan: [employer] / Product Type: HMO /    In time: 56   Out time: 330  Total Treatment Time (min): 36    Visit #: 9 of     Treatment Area: Low back pain [M54.5]    SUBJECTIVE  Pain Level (0-10 scale):  0/10   Any medication changes, allergies to medications, adverse drug reactions, diagnosis change, or new procedure performed?: [x] No    [] Yes (see summary sheet for update)  Subjective functional status/changes:   [] No changes reported  I would say that I am 75% better. It is hard to say for sure because some days are good and some days arent so good. But, I feel like I now have the tools to manage this problem on my own if I do have pain. I ordered an inversion table to use at home because the traction here really helped. OBJECTIVE  36 min Therapeutic Exercise:  [x] See flow sheet : Review HEP   Rationale: increase ROM and increase strength to improve the patients ability to tolerate prolonged walking and standing          x min Patient Education: [x] Review HEP    [] Progressed/Changed HEP based on: sequencing of gluts  [] positioning   [] body mechanics   [] transfers   [] heat/ice application        Other Objective/Functional Measures:   FOTO 72/100  Patient reports being able to sit on an airplane for most of the day without increased pain    Core strength 4+/5 without c/o pain with MMT. Patient able to perform 100% bridge without c/o pain  Patient able to demonstrate proper form with lifting 20# box from floor to waist level. Pain Level (0-10 scale) post treatment: 0/10     ASSESSMENT/Changes in Function:   Patient DC from PT this session due to goals met/ progressing to be met.  PT reviewed HEP with patient and issued a written/pictorial handout for patient to continue at home.      []  See Plan of Care  []  See progress note/recertification  [x]  See Discharge Summary         Progress towards goals / Updated goals: · Short Term Goals: To be accomplished in  1  weeks:  · 1. Pt will be IND and compliant with HEP for self-management of symptoms.  RESOLVED per patient report 3/8/2018     Long Term Goals: To be accomplished in  4  weeks:  1. Pt will be able to sit for 4 hours without pain to be able to perform job duties without restrictions. RESOLVED per patient report 3/15/2019   2. Pt will improve core/lumbar strength to 5/5 to prevent re-occurrence of symptoms. Progressing. 4+/5 3/15/2019   3. Pt will be able lift 20# from floor to counter with proper body mechanics to decrease stress on the lower spine. RESOLVED 3/15/2019   4. Pt will improve FOTO score to at least 69/100 as a functional indicator of improved mobility.   RESOLVED    PLAN  []  Upgrade activities as tolerated     []  Continue plan of care  []  Update interventions per flow sheet       [x]  Discharge due to:_Goals met/ progressing to be met.    []  Other:_    Germaine Storey PT 3/15/2019  3:52 PM

## 2019-03-18 ENCOUNTER — APPOINTMENT (OUTPATIENT)
Dept: PHYSICAL THERAPY | Age: 54
End: 2019-03-18
Payer: COMMERCIAL

## 2019-03-18 NOTE — PROGRESS NOTES
[address] [facility] AT [facility]  [address] [facility] [address] [address], [place], [place]  Phone [phone]  Fax [phone]  DISCHARGE SUMMARY  Patient Name: Collins Degroot : 1965   Treatment/Medical Diagnosis: Low back pain [M54.5]   Referral Source: Kirt Vasquez MD     Date of Initial Visit: 2019 Attended Visits: 9 Missed Visits: 0     SUMMARY OF TREATMENT  Patient has received Physical Therapy sevices for LBP since 2019. PT has included extension based and core strengthening exercises and activities, manual therapy for alignment and modalities to include mechanical lumbar traction to decrease pain and improve function. Patient reports 75% improvement since starting care and is I and compliant with HEP to continue to manage symptoms on his own. CURRENT STATUS  FOTO 72/100  Patient reports being able to sit on an airplane for most of the day without increased pain    Core strength 4+/5 without c/o pain with MMT. Patient able to perform 100% bridge without c/o pain  Patient able to demonstrate proper form with lifting 20# box from floor to waist level. GOALS/ MEASURE OF PROGRESS:   Short Term Goals: To be accomplished in  1  weeks:  1. Pt will be IND and compliant with HEP for self-management of symptoms.  RESOLVED per patient report 3/8/2018   Long Term Goals: To be accomplished in  4  weeks:  1. Pt will be able to sit for 4 hours without pain to be able to perform job duties without restrictions. RESOLVED per patient report 3/15/2019   2. Pt will improve core/lumbar strength to 5/5 to prevent re-occurrence of symptoms. Progressing. 4+/5 3/15/2019   3. Pt will be able lift 20# from floor to counter with proper body mechanics to decrease stress on the lower spine.  RESOLVED 3/15/2019   4. Pt will improve FOTO score to at least 69/100 as a functional indicator of improved mobility.   RESOLVED 3/15/2019        RECOMMENDATIONS  Discontinue therapy. Progressing towards or have reached established goals. If you have any questions/comments please contact us directly at [phone]. Thank you for allowing us to assist in the care of your patient.     Therapist Signature: Kaila Gilbert, ACE Date: 3/15/2019     Time: 8:05 PM

## 2019-03-22 ENCOUNTER — APPOINTMENT (OUTPATIENT)
Dept: PHYSICAL THERAPY | Age: 54
End: 2019-03-22
Payer: COMMERCIAL

## 2019-03-25 ENCOUNTER — APPOINTMENT (OUTPATIENT)
Dept: PHYSICAL THERAPY | Age: 54
End: 2019-03-25
Payer: COMMERCIAL

## 2019-03-29 ENCOUNTER — APPOINTMENT (OUTPATIENT)
Dept: PHYSICAL THERAPY | Age: 54
End: 2019-03-29
Payer: COMMERCIAL